# Patient Record
Sex: FEMALE | Race: WHITE | NOT HISPANIC OR LATINO | Employment: FULL TIME | URBAN - METROPOLITAN AREA
[De-identification: names, ages, dates, MRNs, and addresses within clinical notes are randomized per-mention and may not be internally consistent; named-entity substitution may affect disease eponyms.]

---

## 2022-02-17 ENCOUNTER — NON-PROVIDER VISIT (OUTPATIENT)
Dept: OCCUPATIONAL MEDICINE | Facility: CLINIC | Age: 22
End: 2022-02-17

## 2022-02-17 DIAGNOSIS — Z02.1 PRE-EMPLOYMENT HEALTH SCREENING EXAMINATION: ICD-10-CM

## 2022-02-17 DIAGNOSIS — Z02.1 PRE-EMPLOYMENT DRUG SCREENING: ICD-10-CM

## 2022-02-17 LAB
AMP AMPHETAMINE: NORMAL
BAR BARBITURATES: NORMAL
BZO BENZODIAZEPINES: NORMAL
COC COCAINE: NORMAL
INT CON NEG: NORMAL
INT CON POS: NORMAL
MDMA ECSTASY: NORMAL
MET METHAMPHETAMINES: NORMAL
MTD METHADONE: NORMAL
OPI OPIATES: NORMAL
OXY OXYCODONE: NORMAL
PCP PHENCYCLIDINE: NORMAL
POC URINE DRUG SCREEN OCDRS: NEGATIVE
THC: NORMAL

## 2022-02-17 PROCEDURE — 80305 DRUG TEST PRSMV DIR OPT OBS: CPT | Performed by: PREVENTIVE MEDICINE

## 2023-10-31 ENCOUNTER — APPOINTMENT (OUTPATIENT)
Dept: RADIOLOGY | Facility: MEDICAL CENTER | Age: 23
End: 2023-10-31
Attending: STUDENT IN AN ORGANIZED HEALTH CARE EDUCATION/TRAINING PROGRAM
Payer: COMMERCIAL

## 2023-10-31 ENCOUNTER — HOSPITAL ENCOUNTER (EMERGENCY)
Facility: MEDICAL CENTER | Age: 23
End: 2023-10-31
Attending: STUDENT IN AN ORGANIZED HEALTH CARE EDUCATION/TRAINING PROGRAM
Payer: COMMERCIAL

## 2023-10-31 VITALS
SYSTOLIC BLOOD PRESSURE: 119 MMHG | DIASTOLIC BLOOD PRESSURE: 58 MMHG | RESPIRATION RATE: 18 BRPM | BODY MASS INDEX: 42.68 KG/M2 | WEIGHT: 250 LBS | TEMPERATURE: 97.2 F | HEART RATE: 79 BPM | HEIGHT: 64 IN | OXYGEN SATURATION: 98 %

## 2023-10-31 DIAGNOSIS — R00.2 PALPITATIONS: ICD-10-CM

## 2023-10-31 LAB
ALBUMIN SERPL BCP-MCNC: 4.2 G/DL (ref 3.2–4.9)
ALBUMIN/GLOB SERPL: 1 G/DL
ALP SERPL-CCNC: 98 U/L (ref 30–99)
ALT SERPL-CCNC: 13 U/L (ref 2–50)
ANION GAP SERPL CALC-SCNC: 13 MMOL/L (ref 7–16)
AST SERPL-CCNC: 15 U/L (ref 12–45)
BASOPHILS # BLD AUTO: 0.7 % (ref 0–1.8)
BASOPHILS # BLD: 0.08 K/UL (ref 0–0.12)
BILIRUB SERPL-MCNC: 0.2 MG/DL (ref 0.1–1.5)
BUN SERPL-MCNC: 10 MG/DL (ref 8–22)
CALCIUM ALBUM COR SERPL-MCNC: 8.9 MG/DL (ref 8.5–10.5)
CALCIUM SERPL-MCNC: 9.1 MG/DL (ref 8.5–10.5)
CHLORIDE SERPL-SCNC: 104 MMOL/L (ref 96–112)
CO2 SERPL-SCNC: 22 MMOL/L (ref 20–33)
CREAT SERPL-MCNC: 0.56 MG/DL (ref 0.5–1.4)
EKG IMPRESSION: NORMAL
EOSINOPHIL # BLD AUTO: 0.11 K/UL (ref 0–0.51)
EOSINOPHIL NFR BLD: 1 % (ref 0–6.9)
ERYTHROCYTE [DISTWIDTH] IN BLOOD BY AUTOMATED COUNT: 43.3 FL (ref 35.9–50)
GFR SERPLBLD CREATININE-BSD FMLA CKD-EPI: 130 ML/MIN/1.73 M 2
GLOBULIN SER CALC-MCNC: 4.1 G/DL (ref 1.9–3.5)
GLUCOSE SERPL-MCNC: 78 MG/DL (ref 65–99)
HCG SERPL QL: NEGATIVE
HCT VFR BLD AUTO: 44.4 % (ref 37–47)
HGB BLD-MCNC: 14.2 G/DL (ref 12–16)
IMM GRANULOCYTES # BLD AUTO: 0.04 K/UL (ref 0–0.11)
IMM GRANULOCYTES NFR BLD AUTO: 0.4 % (ref 0–0.9)
LYMPHOCYTES # BLD AUTO: 4.54 K/UL (ref 1–4.8)
LYMPHOCYTES NFR BLD: 40.5 % (ref 22–41)
MAGNESIUM SERPL-MCNC: 2.1 MG/DL (ref 1.5–2.5)
MCH RBC QN AUTO: 28 PG (ref 27–33)
MCHC RBC AUTO-ENTMCNC: 32 G/DL (ref 32.2–35.5)
MCV RBC AUTO: 87.4 FL (ref 81.4–97.8)
MONOCYTES # BLD AUTO: 0.56 K/UL (ref 0–0.85)
MONOCYTES NFR BLD AUTO: 5 % (ref 0–13.4)
NEUTROPHILS # BLD AUTO: 5.88 K/UL (ref 1.82–7.42)
NEUTROPHILS NFR BLD: 52.4 % (ref 44–72)
NRBC # BLD AUTO: 0 K/UL
NRBC BLD-RTO: 0 /100 WBC (ref 0–0.2)
PLATELET # BLD AUTO: 333 K/UL (ref 164–446)
PMV BLD AUTO: 10.1 FL (ref 9–12.9)
POTASSIUM SERPL-SCNC: 3.7 MMOL/L (ref 3.6–5.5)
PROT SERPL-MCNC: 8.3 G/DL (ref 6–8.2)
RBC # BLD AUTO: 5.08 M/UL (ref 4.2–5.4)
SODIUM SERPL-SCNC: 139 MMOL/L (ref 135–145)
TROPONIN T SERPL-MCNC: <6 NG/L (ref 6–19)
TSH SERPL DL<=0.005 MIU/L-ACNC: 4.56 UIU/ML (ref 0.38–5.33)
WBC # BLD AUTO: 11.2 K/UL (ref 4.8–10.8)

## 2023-10-31 PROCEDURE — 93005 ELECTROCARDIOGRAM TRACING: CPT

## 2023-10-31 PROCEDURE — 36415 COLL VENOUS BLD VENIPUNCTURE: CPT

## 2023-10-31 PROCEDURE — 71046 X-RAY EXAM CHEST 2 VIEWS: CPT

## 2023-10-31 PROCEDURE — 84703 CHORIONIC GONADOTROPIN ASSAY: CPT

## 2023-10-31 PROCEDURE — 99283 EMERGENCY DEPT VISIT LOW MDM: CPT

## 2023-10-31 PROCEDURE — 85025 COMPLETE CBC W/AUTO DIFF WBC: CPT

## 2023-10-31 PROCEDURE — 84443 ASSAY THYROID STIM HORMONE: CPT

## 2023-10-31 PROCEDURE — 93005 ELECTROCARDIOGRAM TRACING: CPT | Performed by: STUDENT IN AN ORGANIZED HEALTH CARE EDUCATION/TRAINING PROGRAM

## 2023-10-31 PROCEDURE — 83735 ASSAY OF MAGNESIUM: CPT

## 2023-10-31 PROCEDURE — 80053 COMPREHEN METABOLIC PANEL: CPT

## 2023-10-31 PROCEDURE — 84484 ASSAY OF TROPONIN QUANT: CPT

## 2023-11-01 NOTE — ED PROVIDER NOTES
"ED Provider Note    CHIEF COMPLAINT  Chief Complaint   Patient presents with    Palpitations     Pt states intermittent palpitations x 1 week  Palpitations occurring more frequently up to 5-6 times per day  Today she felt palpitations for over 1hr  Pt states occasional chest pressure   Denies n/v/d       EXTERNAL RECORDS REVIEWED      HPI/ROS  LIMITATION TO HISTORY     OUTSIDE HISTORIAN(S):      Ewelina Burns is a 23 y.o. female who presents with palpitations.  Patient says over the past week she has been having intermittent palpitations 5-6 times a day.  Patient says that today she had sustained palpitations for over an hour.  Patient says she has had some mild chest discomfort no active pain now.  Patient denies lightheadedness, syncope, shortness of breath, abdominal pain, vomiting, diarrhea, bloody stool, vaginal bleeding, urinary changes.  Patient denies drug or alcohol use.  Patient denies supplement use.  No family history of sudden cardiac death.    PAST MEDICAL HISTORY   has a past medical history of Patient denies medical problems.    SURGICAL HISTORY  patient denies any surgical history    FAMILY HISTORY  History reviewed. No pertinent family history.    SOCIAL HISTORY  Social History     Tobacco Use    Smoking status: Never    Smokeless tobacco: Never   Substance and Sexual Activity    Alcohol use: Not Currently    Drug use: Never    Sexual activity: Not on file       CURRENT MEDICATIONS  Home Medications       Reviewed by Jama Messina R.N. (Registered Nurse) on 10/31/23 at 1819  Med List Status: Not Addressed     Medication Last Dose Status        Patient Donte Taking any Medications                           ALLERGIES  Allergies   Allergen Reactions    Penicillins        PHYSICAL EXAM  VITAL SIGNS: /58   Pulse 79   Temp 36.2 °C (97.2 °F) (Temporal)   Resp 18   Ht 1.626 m (5' 4\")   Wt 113 kg (250 lb)   LMP 10/24/2023 (Approximate)   SpO2 98%   BMI 42.91 kg/m²    Constitutional: " Alert in no apparent distress.  HENT: No signs of trauma, Bilateral external ears normal, Nose normal.   Eyes: Pupils are equal and reactive, Conjunctiva normal, Non-icteric.   Neck: Normal range of motion, No tenderness, Supple, No stridor.   Cardiovascular: Regular rate and rhythm, no murmurs.   Thorax & Lungs: Normal breath sounds, No respiratory distress, No wheezing, No chest tenderness.   Abdomen: Bowel sounds normal, Soft, No tenderness, No masses, No pulsatile masses.   Skin: Warm, Dry, No erythema, No rash.   Back: No bony tenderness, No CVA tenderness.   Extremities: Intact distal pulses, No edema, No tenderness, No cyanosis  Musculoskeletal: Good range of motion in all major joints.   Neurologic: Alert , Normal motor function, Normal sensory function, No focal deficits noted.   Psychiatric: Affect normal, Judgment normal, Mood normal.    DIAGNOSTIC STUDIES / PROCEDURES  EKG  I have independently interpreted this EKG  Results for orders placed or performed during the hospital encounter of 10/31/23   EKG   Result Value Ref Range    Report       [facility] Emergency [facility]    Test Date:  2023-10-31  Pt Name:    GEOVANNA HAND     Department: ER  MRN:        [id number]                      Room:  Gender:     Female                       Technician: [id number]  :        2000                   Requested By:ER TRIAGE PROTOCOL  Order #:    [id number]                    Reading MD: Bubba Finch    Measurements  Intervals                                Axis  Rate:       78                           P:          40  AR:         157                          QRS:        -3  QRSD:       92                           T:          17  QT:         377  QTc:        430    Interpretive Statements  Interpreted by me: Sinus rhythm, rate 78, normal intervals, no signs of acute  ischemia, no signs of WPW, Brugada, ARVD, HCM, prolonged QTc  Electronically Signed On 10- 18:36:53 PDT by  Bubba Bland [place]           LABS  Labs Reviewed   CBC WITH DIFFERENTIAL - Abnormal; Notable for the following components:       Result Value    WBC 11.2 (*)     MCHC 32.0 (*)     All other components within normal limits    Narrative:     Biotin intake of greater than 5 mg per day may interfere with  troponin levels, causing false low values.   COMP METABOLIC PANEL - Abnormal; Notable for the following components:    Total Protein 8.3 (*)     Globulin 4.1 (*)     All other components within normal limits    Narrative:     Biotin intake of greater than 5 mg per day may interfere with  troponin levels, causing false low values.   HCG QUAL SERUM   TROPONIN    Narrative:     Biotin intake of greater than 5 mg per day may interfere with  troponin levels, causing false low values.   TSH WITH REFLEX TO FT4   MAGNESIUM    Narrative:     Biotin intake of greater than 5 mg per day may interfere with  troponin levels, causing false low values.   ESTIMATED GFR    Narrative:     Biotin intake of greater than 5 mg per day may interfere with  troponin levels, causing false low values.         RADIOLOGY  I have independently interpreted the diagnostic imaging associated with this visit and am waiting the final reading from the radiologist.   My preliminary interpretation is as follows: No pneumothorax  Radiologist interpretation:   DX-CHEST-2 VIEWS   Final Result      No acute cardiopulmonary abnormality.            COURSE & MEDICAL DECISION MAKING    ED Observation Status?     INITIAL ASSESSMENT, COURSE AND PLAN  Care Narrative: Overall patient well-appearing, in no acute distress mildly hypertensive otherwise no vital sign abnormalities.  Patient without signs of heart failure.  No risk factors for PE, patient is PERC negative.  ECG without concerning changes predisposing to dangerous cardiac arrhythmia.  Obtain blood work to assess for metabolic derangements, hematologic derangements, elevated cardiac biomarkers or thyroid  dysfunction.    Blood work with mild leukocytosis otherwise unremarkable including negative troponin normal TSH.  Patient is afebrile no exam or history suggestive of infectious process.  Discussed results of testing with patient, return precautions and outpatient follow-up which she is comfortable with.        ADDITIONAL PROBLEM LIST    DISPOSITION AND DISCUSSIONS    FINAL DIAGNOSIS  1. Palpitations           Electronically signed by: Bubba Finch D.O., 10/31/2023 6:35 PM

## 2023-11-01 NOTE — ED TRIAGE NOTES
"Chief Complaint   Patient presents with    Palpitations     Pt states intermittent palpitations x 1 week  Palpitations occurring more frequently up to 5-6 times per day  Today she felt palpitations for over 1hr  Pt states occasional chest pressure   Denies n/v/d     BP (!) 143/87   Pulse 84   Temp 36.1 °C (96.9 °F) (Temporal)   Resp 16   Ht 1.626 m (5' 4\")   Wt 113 kg (250 lb)   LMP 10/24/2023 (Approximate)   SpO2 98%   BMI 42.91 kg/m²     Pt made aware of triage process, placed back into lobby, educated pt to tell staff of any worsening of symptoms     "

## 2023-11-01 NOTE — DISCHARGE INSTRUCTIONS
As we discussed if you develop uncontrolled chest pain, trouble breathing, passing out you should return to the emergency department.  You should follow-up with your primary care provider.

## 2024-12-05 ENCOUNTER — NON-PROVIDER VISIT (OUTPATIENT)
Dept: OCCUPATIONAL MEDICINE | Facility: CLINIC | Age: 24
End: 2024-12-05

## 2024-12-05 DIAGNOSIS — Z02.1 PRE-EMPLOYMENT DRUG SCREENING: ICD-10-CM

## 2024-12-05 DIAGNOSIS — Z02.89 VISIT FOR OCCUPATIONAL HEALTH EXAMINATION: ICD-10-CM

## 2024-12-05 LAB
AMP AMPHETAMINE: NORMAL
COC COCAINE: NORMAL
INT CON NEG: NORMAL
INT CON POS: NORMAL
MET METHAMPHETAMINES: NORMAL
OPI OPIATES: NORMAL
PCP PHENCYCLIDINE: NORMAL
POC DRUG COMMENT 753798-OCCUPATIONAL HEALTH: NORMAL
THC: NORMAL

## 2024-12-05 PROCEDURE — 80305 DRUG TEST PRSMV DIR OPT OBS: CPT | Performed by: NURSE PRACTITIONER

## 2025-02-25 ENCOUNTER — OFFICE VISIT (OUTPATIENT)
Dept: URGENT CARE | Facility: PHYSICIAN GROUP | Age: 25
End: 2025-02-25

## 2025-02-25 VITALS
OXYGEN SATURATION: 97 % | WEIGHT: 264.55 LBS | DIASTOLIC BLOOD PRESSURE: 78 MMHG | HEART RATE: 116 BPM | RESPIRATION RATE: 20 BRPM | SYSTOLIC BLOOD PRESSURE: 122 MMHG | TEMPERATURE: 99.5 F | BODY MASS INDEX: 44.08 KG/M2 | HEIGHT: 65 IN

## 2025-02-25 DIAGNOSIS — R68.89 FLU-LIKE SYMPTOMS: ICD-10-CM

## 2025-02-25 PROCEDURE — 3078F DIAST BP <80 MM HG: CPT | Performed by: PHYSICIAN ASSISTANT

## 2025-02-25 PROCEDURE — 99203 OFFICE O/P NEW LOW 30 MIN: CPT | Performed by: PHYSICIAN ASSISTANT

## 2025-02-25 PROCEDURE — 3074F SYST BP LT 130 MM HG: CPT | Performed by: PHYSICIAN ASSISTANT

## 2025-02-25 ASSESSMENT — FIBROSIS 4 INDEX: FIB4 SCORE: 0.32

## 2025-02-25 ASSESSMENT — ENCOUNTER SYMPTOMS
COUGH: 1
FEVER: 1
SORE THROAT: 1

## 2025-02-25 NOTE — PROGRESS NOTES
"  Subjective:   Ewelina Burns is a 24 y.o. female who presents today with   Chief Complaint   Patient presents with    Cough     \"Swallowed weird\" on Monday, had dry cough duration of day, hoarseness yesterday and fever yesterday, today has lingering cough and sore throat.     Cough  This is a new problem. The current episode started yesterday. The problem has been unchanged. The problem occurs every few minutes. Associated symptoms include a fever and a sore throat.     Patient states she was drinking some milk tea and had a coughing spell.  Had slight cough Sunday night.  She states that she did not have any Boba in the Tea and did not feel like she aspirated anything but did have to cough up the tea.  She states that today she woke up with bodyaches and fevers.    PMH:  has a past medical history of Patient denies medical problems.  MEDS: No current outpatient medications on file.  ALLERGIES:   Allergies   Allergen Reactions    Penicillins      SURGHX: No past surgical history on file.  SOCHX:  reports that she has never smoked. She has never used smokeless tobacco. She reports that she does not currently use alcohol. She reports that she does not use drugs.  FH: Reviewed with patient, not pertinent to this visit.     Review of Systems   Constitutional:  Positive for fever.   HENT:  Positive for sore throat.    Respiratory:  Positive for cough.       Objective:   /78 (BP Location: Left arm, Patient Position: Sitting, BP Cuff Size: Adult long)   Pulse (!) 116   Temp 37.5 °C (99.5 °F) (Temporal)   Resp 20   Ht 1.651 m (5' 5\")   Wt 120 kg (264 lb 8.8 oz)   SpO2 97%   BMI 44.02 kg/m²   Physical Exam  Vitals and nursing note reviewed.   Constitutional:       General: She is not in acute distress.     Appearance: Normal appearance. She is well-developed. She is not ill-appearing or toxic-appearing.   HENT:      Head: Normocephalic and atraumatic.      Right Ear: Hearing normal.      Left Ear: " Hearing normal.      Mouth/Throat:      Mouth: Mucous membranes are moist.      Pharynx: No oropharyngeal exudate or posterior oropharyngeal erythema.   Cardiovascular:      Rate and Rhythm: Normal rate and regular rhythm.      Heart sounds: Normal heart sounds.   Pulmonary:      Effort: Pulmonary effort is normal. No respiratory distress.      Breath sounds: Normal breath sounds. No stridor. No wheezing, rhonchi or rales.   Musculoskeletal:      Comments: Normal movement in all 4 extremities   Skin:     General: Skin is warm and dry.   Neurological:      Mental Status: She is alert.      Coordination: Coordination normal.   Psychiatric:         Mood and Affect: Mood normal.       Assessment/Plan:   Assessment    1. Flu-like symptoms    Symptoms and presentation appear consistent with likely viral flulike illness.  Offered Tamiflu and viral testing but patient declines.  She will continue with over-the-counter symptomatic treatment.  She declines testing and further treatment to avoid incurring any further cost.  No signs of pneumonia or indication for need of x-ray at this time.  Did discuss return precautions with patient.  No indication for antibiotics at this time.    Differential diagnosis, natural history, supportive care, and indications for immediate follow-up discussed.   Patient given instructions and understanding of medications and treatment.    If not improving in 3-5 days, F/U with PCP or return to  if symptoms worsen.    Patient agreeable to plan.      Please note that this dictation was created using voice recognition software. I have made every reasonable attempt to correct obvious errors, but I expect that there are errors of grammar and possibly content that I did not discover before finalizing the note.    Myron Almanzar PA-C